# Patient Record
Sex: FEMALE | Race: WHITE | URBAN - NONMETROPOLITAN AREA
[De-identification: names, ages, dates, MRNs, and addresses within clinical notes are randomized per-mention and may not be internally consistent; named-entity substitution may affect disease eponyms.]

---

## 2021-12-06 ENCOUNTER — TELEPHONE (OUTPATIENT)
Dept: FAMILY MEDICINE CLINIC | Age: 41
End: 2021-12-06

## 2021-12-06 NOTE — TELEPHONE ENCOUNTER
I called the patient back and she said that she has been having some chest pain since Saturday, but it's not that bad. She said that she [facility] and it said \"Inconclusive Right Bundle Branch Block. \" She said she had another one done today and it was Normal Sinus Rhythm. She wanted to know if you could order some labs for her to have done, an EKG, and a cardiology referral so that she can avoid going to the ER because she does not want to go if she does not have to. I advised her that she should go for the chest pain, but she said she would like to see if you could order these first instead. I told her you were out of the office so I'm not sure when I will get a response, so if she were to have any increased chest pain or sob before she hears back from me then she need to go to the ER. She verbalized understanding.

## 2021-12-06 NOTE — TELEPHONE ENCOUNTER
This pt called asking for you. I told her you were on the phone. She wouldn't give me any information after I asked what it was regarding. She just said she had some questions  that she wanted you to ask Naz. [phone].

## 2021-12-07 NOTE — TELEPHONE ENCOUNTER
I haven't seen this pt since we've been in epic. She will need an apt when I have an opening; she will need to go to the er as you've told her if she develops any concerning s/s in the interim.

## 2021-12-27 ENCOUNTER — OFFICE VISIT (OUTPATIENT)
Dept: FAMILY MEDICINE CLINIC | Age: 41
End: 2021-12-27
Payer: COMMERCIAL

## 2021-12-27 VITALS
HEIGHT: 63 IN | WEIGHT: 132.2 LBS | DIASTOLIC BLOOD PRESSURE: 70 MMHG | BODY MASS INDEX: 23.42 KG/M2 | HEART RATE: 97 BPM | OXYGEN SATURATION: 98 % | RESPIRATION RATE: 18 BRPM | SYSTOLIC BLOOD PRESSURE: 116 MMHG

## 2021-12-27 DIAGNOSIS — R07.9 CHEST PAIN, UNSPECIFIED TYPE: ICD-10-CM

## 2021-12-27 DIAGNOSIS — R00.2 PALPITATIONS: ICD-10-CM

## 2021-12-27 DIAGNOSIS — Z00.00 WELLNESS EXAMINATION: Primary | ICD-10-CM

## 2021-12-27 DIAGNOSIS — Z82.49 FAMILY HISTORY OF MI (MYOCARDIAL INFARCTION): ICD-10-CM

## 2021-12-27 PROCEDURE — 99395 PREV VISIT EST AGE 18-39: CPT | Performed by: NURSE PRACTITIONER

## 2021-12-27 PROCEDURE — 99214 OFFICE O/P EST MOD 30 MIN: CPT | Performed by: NURSE PRACTITIONER

## 2021-12-27 RX ORDER — DESVENLAFAXINE SUCCINATE 50 MG/1
TABLET, EXTENDED RELEASE ORAL
Qty: 30 TABLET | Refills: 2 | Status: SHIPPED | OUTPATIENT
Start: 2021-12-27

## 2021-12-27 RX ORDER — DESVENLAFAXINE 25 MG/1
TABLET, EXTENDED RELEASE ORAL
Qty: 7 TABLET | Refills: 0 | Status: SHIPPED | OUTPATIENT
Start: 2021-12-27

## 2021-12-27 NOTE — PROGRESS NOTES
Lynsey Carlin presents today for   Chief Complaint   Patient presents with    Follow-up       Is someone accompanying this pt? No    Is the patient using any DME equipment during OV? No    Depression Screening:  3 most recent PHQ Screens 12/27/2021   Little interest or pleasure in doing things Not at all   Feeling down, depressed, irritable, or hopeless Not at all   Total Score PHQ 2 0       Learning Assessment:  Learning Assessment 12/27/2021   PRIMARY LEARNER Patient   HIGHEST LEVEL OF EDUCATION - PRIMARY LEARNER  4 YEARS OF COLLEGE   BARRIERS PRIMARY LEARNER NONE   CO-LEARNER CAREGIVER No   PRIMARY LANGUAGE ENGLISH   LEARNER PREFERENCE PRIMARY LISTENING   ANSWERED BY patient   RELATIONSHIP SELF       Health Maintenance reviewed and discussed and ordered per Provider. Health Maintenance Due   Topic Date Due    Hepatitis C Screening  Never done    DTaP/Tdap/Td series (1 - Tdap) Never done    Cervical cancer screen  Never done    COVID-19 Vaccine (3 - Booster for Pfizer series) 08/19/2021   . Coordination of Care:  1. Have you been to the ER, urgent care clinic since your last visit? Hospitalized since your last visit? No    2. Have you seen or consulted any other health care providers outside of the [address] [place] [address] since your last visit? Include any pap smears or colon screening.  No

## 2021-12-27 NOTE — PROGRESS NOTES
Sanchez Dorsey is a 46 y. o.female presents with   Chief Complaint   Patient presents with    Follow-up       42-year-old female presents today in office for annual adult preventive exam.  Today she complains of intermittent periods of left-sided chest pain which she describes as \"gripping and turning \". She goes on to admit to being under increasing amounts of stress related to building a new home. She also expresses concern as her biological mother  at the age of 47  as a result of sudden cardiac death. Subjective:           Past Medical History:   Diagnosis Date    Acid reflux     Anxiety     Depression      Past Surgical History:   Procedure Laterality Date    HX APPENDECTOMY      HX  SECTION      HX CYSTECTOMY      HX PARTIAL HYSTERECTOMY      half ovary left     Social History     Tobacco Use    Smoking status: Former Smoker     Years: 10.00    Smokeless tobacco: Never Used   Vaping Use    Vaping Use: Never used   Substance and Sexual Activity    Alcohol use: Yes     Comment: social    Drug use: Never    Sexual activity: Yes     Partners: Male     Birth control/protection: Surgical     Current Outpatient Medications   Medication Sig Dispense Refill    desvenlafaxine succinate (Pristiq) 25 mg ER tablet 1 tab po once daily x 7 days 7 Tablet 0    desvenlafaxine succinate (Pristiq) 50 mg ER tablet 1 tab po once daily; begin after completion of 7-day course of pristiq 25 mg 30 Tablet 2     Allergies   Allergen Reactions    Zoloft [Sertraline] Other (comments)     Convulsions, passed out, loss of bowel control     The patient has a family history of    REVIEW OF SYSTEMS  Review of Systems   Constitutional: Negative for chills and fever. HENT: Negative for ear discharge, ear pain, hearing loss, sinus pain and sore throat. Eyes: Negative for pain. Respiratory: Negative for cough and shortness of breath. Cardiovascular: Positive for chest pain and palpitations.  Negative for leg swelling. Gastrointestinal: Negative for abdominal pain, nausea and vomiting. Genitourinary: Negative for dysuria, frequency and urgency. Musculoskeletal: Negative for falls, myalgias and neck pain. Skin: Negative for rash. Neurological: Negative for dizziness, tingling, tremors and headaches. Psychiatric/Behavioral: Negative for depression, substance abuse and suicidal ideas. The patient is nervous/anxious. The patient does not have insomnia. Objective:     Visit Vitals  /70 (BP 1 Location: Left upper arm, BP Patient Position: Sitting, BP Cuff Size: Adult)   Pulse 97   Resp 18   Ht 5' 3\" (1.6 m)   Wt 132 lb 3.2 oz (60 kg)   SpO2 98%   BMI 23.42 kg/m²       Current Outpatient Medications   Medication Instructions    desvenlafaxine succinate (Pristiq) 25 mg ER tablet 1 tab po once daily x 7 days    desvenlafaxine succinate (Pristiq) 50 mg ER tablet 1 tab po once daily; begin after completion of 7-day course of pristiq 25 mg        PHYSICAL EXAM  Physical Exam  Cardiovascular:      Heart sounds: Normal heart sounds. Pulmonary:      Breath sounds: Normal breath sounds. Musculoskeletal:         General: No swelling. Lymphadenopathy:      Cervical: No cervical adenopathy. Skin:     General: Skin is warm. Neurological:      Mental Status: She is alert and oriented to person, place, and time. Psychiatric:         Mood and Affect: Mood normal.         Behavior: Behavior normal.         Thought Content: Thought content normal.         Judgment: Judgment normal.         Assessment/Plan:     Diagnoses and all orders for this visit:    1. Wellness examination  -     CBC W/O DIFF  -     LIPID PANEL  -     METABOLIC PANEL, COMPREHENSIVE  -     VITAMIN D, 25 HYDROXY  -     VITAMIN B12  -     HEPATITIS C AB    2.  Chest pain, unspecified type  -     TSH 3RD GENERATION  -     THYROID STIMULATING IMMUNOGLOBULIN  -     THYROID PEROXIDASE (TPO) AB  -     MAGNESIUM  -     T4, FREE  -     ECHO ADULT COMPLETE; Future  -     NUCLEAR CARDIAC STRESS TEST; Future  Given patient's familial history along with her complaints of today I am ordering stress test echocardiogram as well as multiple labs I will contact her once I receive the results. Patient will call 911 or go to the closest emergency department if she develops chest pain palpitations or other concerning signs or symptoms. 3. Family history of MI (myocardial infarction)  -     ECHO ADULT COMPLETE; Future  -     NUCLEAR CARDIAC STRESS TEST; Future    4. Palpitations  -     TSH 3RD GENERATION  -     THYROID STIMULATING IMMUNOGLOBULIN  -     THYROID PEROXIDASE (TPO) AB  -     MAGNESIUM  -     T4, FREE    Other orders  -     desvenlafaxine succinate (Pristiq) 25 mg ER tablet; 1 tab po once daily x 7 days  -     desvenlafaxine succinate (Pristiq) 50 mg ER tablet; 1 tab po once daily; begin after completion of 7-day course of pristiq 25 mg        Follow-up and Dispositions    · Return Contingent upon test results. Disclaimer:    I have discussed the diagnosis with the patient and the intended plan as seen above. The patient understands our medical plan. The risks, benefits and significant side effects of all medications have been reviewed. Anticipated time course and progression of condition reviewed. All questions have been addressed. She received an after visit summary, with information reviewed, and questions answered. Where appropriate, she is instructed to call the clinic if she has not been notified either by phone or through [address] with the results of her tests or with an appointment plan for any referrals within 1 week(s). The patient  is to call if her condition worsens or fails to improve or if significant side effects are experienced.        Michelle Ngo NP

## 2022-02-15 ENCOUNTER — TELEPHONE (OUTPATIENT)
Dept: FAMILY MEDICINE CLINIC | Age: 42
End: 2022-02-15

## 2022-02-15 NOTE — TELEPHONE ENCOUNTER
----- Message from Ciera Hernandez sent at 2/15/2022  8:29 AM EST -----  Subject: Message to Provider    QUESTIONS  Information for Provider? pt would like a call back about her test results   and a plan of action   ---------------------------------------------------------------------------  --------------  [address]  What is the best way for the office to contact you? OK to leave message on   voicemail  Preferred Call Back Phone Number? [phone]  ---------------------------------------------------------------------------  --------------  SCRIPT ANSWERS  Relationship to Patient?  Self

## 2022-03-02 ENCOUNTER — TELEPHONE (OUTPATIENT)
Dept: FAMILY MEDICINE CLINIC | Age: 42
End: 2022-03-02

## 2022-03-24 ENCOUNTER — TELEPHONE (OUTPATIENT)
Dept: FAMILY MEDICINE CLINIC | Age: 42
End: 2022-03-24

## 2022-03-24 DIAGNOSIS — R07.9 CHEST PAIN, UNSPECIFIED TYPE: Primary | ICD-10-CM

## 2022-03-24 DIAGNOSIS — R00.2 PALPITATIONS: ICD-10-CM

## 2022-03-24 DIAGNOSIS — Z82.49 FAMILY HISTORY OF MI (MYOCARDIAL INFARCTION): ICD-10-CM

## 2022-03-24 NOTE — TELEPHONE ENCOUNTER
----- Message from Layman Senters sent at 3/23/2022  2:23 PM EDT -----  Subject: Referral Request    QUESTIONS   Reason for referral request? Pt would like a referral for a cardiologist.   Has the physician seen you for this condition before? No   Preferred Specialist (if applicable)? Do you already have an appointment scheduled? Additional Information for Provider?   ---------------------------------------------------------------------------  --------------  CALL BACK INFO  What is the best way for the office to contact you? OK to leave message on   voicemail  Preferred Call Back Phone Number?  [phone]

## 2023-02-13 DIAGNOSIS — Z82.49 FAMILY HISTORY OF MI (MYOCARDIAL INFARCTION): ICD-10-CM

## 2023-02-13 DIAGNOSIS — R07.9 CHEST PAIN, UNSPECIFIED TYPE: Primary | ICD-10-CM

## 2023-02-13 DIAGNOSIS — R00.2 PALPITATIONS: ICD-10-CM

## 2023-10-12 DIAGNOSIS — R30.0 BURNING WITH URINATION: Primary | ICD-10-CM

## 2023-10-13 ENCOUNTER — TELEPHONE (OUTPATIENT)
Dept: FAMILY MEDICINE CLINIC | Facility: CLINIC | Age: 43
End: 2023-10-13

## 2023-10-13 ENCOUNTER — TELEMEDICINE (OUTPATIENT)
Dept: FAMILY MEDICINE CLINIC | Facility: CLINIC | Age: 43
End: 2023-10-13

## 2023-10-13 DIAGNOSIS — N30.01 ACUTE CYSTITIS WITH HEMATURIA: Primary | ICD-10-CM

## 2023-10-13 RX ORDER — CIPROFLOXACIN 500 MG/1
500 TABLET, FILM COATED ORAL 2 TIMES DAILY
Qty: 14 TABLET | Refills: 0 | Status: SHIPPED | OUTPATIENT
Start: 2023-10-13 | End: 2023-10-20

## 2023-10-13 NOTE — PROGRESS NOTES
Patient called into the office complaining of \"extreme burning, bladder spasms, frequent urination feeling like razor blades with urination\". Patient relates that through [facility] she was prescribed Macrobid and developed a reaction of some sort therefore they prescribed clindamycin roughly 2 weeks ago. She states that the frequent urination burning and bladder spasms returned October 11, 2023. She relates that she has tried Uricalm with no relief in signs or symptoms. Urinalysis shows small occult blood,  positive nitrates, large leukocytes, urine RBCs 21-50. Starting patient on Cipro 500 mg twice daily x7 days.   12 minutes spent on encounter

## 2023-10-14 LAB
BACTERIA: NEGATIVE
BILIRUB SERPL-MCNC: ABNORMAL MG/DL
BLOOD: ABNORMAL
CLARITY: ABNORMAL
COLOR: ABNORMAL
EPITHELIAL CELLS: ABNORMAL /HPF
GLUCOSE: NEGATIVE MG/DL
HYALINE CASTS: ABNORMAL /LPF (ref 0–2)
ICTOTEST - UA HGH: NEGATIVE
KETONES, URINE: NEGATIVE MG/DL
LEUKOCYTE ESTERASE, URINE: ABNORMAL
NITRITE, URINE: POSITIVE
PH, URINE: 6 PH (ref 5–8)
PROTEIN UA: ABNORMAL MG/DL
RBC URINE: ABNORMAL /HPF
RESULT: NORMAL
SPECIFIC GRAVITY: 1.01 (ref 1–1.03)
UROBILINOGEN: 1 MG/DL
WBC UA: ABNORMAL /HPF (ref 0–5)

## 2024-10-03 ENCOUNTER — OFFICE VISIT (OUTPATIENT)
Facility: CLINIC | Age: 44
End: 2024-10-03

## 2024-10-03 VITALS
RESPIRATION RATE: 18 BRPM | WEIGHT: 134.2 LBS | HEIGHT: 63 IN | HEART RATE: 70 BPM | TEMPERATURE: 98 F | SYSTOLIC BLOOD PRESSURE: 114 MMHG | DIASTOLIC BLOOD PRESSURE: 77 MMHG | BODY MASS INDEX: 23.78 KG/M2 | OXYGEN SATURATION: 98 %

## 2024-10-03 DIAGNOSIS — J01.90 ACUTE BACTERIAL SINUSITIS: ICD-10-CM

## 2024-10-03 DIAGNOSIS — M54.2 NECK PAIN: Primary | ICD-10-CM

## 2024-10-03 DIAGNOSIS — B96.89 ACUTE BACTERIAL SINUSITIS: ICD-10-CM

## 2024-10-03 LAB
MONONUCLEOSIS SCREEN POC: NORMAL
VALID INTERNAL CONTROL: YES

## 2024-10-03 RX ORDER — FLUCONAZOLE 150 MG/1
150 TABLET ORAL ONCE
Qty: 1 TABLET | Refills: 0 | Status: SHIPPED | OUTPATIENT
Start: 2024-10-03 | End: 2024-10-03

## 2024-10-03 RX ORDER — PREDNISONE 10 MG/1
TABLET ORAL
Qty: 1 EACH | Refills: 0 | Status: SHIPPED | OUTPATIENT
Start: 2024-10-03

## 2024-10-03 RX ORDER — DOXYCYCLINE HYCLATE 100 MG
100 TABLET ORAL 2 TIMES DAILY
Qty: 20 TABLET | Refills: 0 | Status: SHIPPED | OUTPATIENT
Start: 2024-10-03 | End: 2024-10-13

## 2024-10-03 SDOH — ECONOMIC STABILITY: FOOD INSECURITY: WITHIN THE PAST 12 MONTHS, THE FOOD YOU BOUGHT JUST DIDN'T LAST AND YOU DIDN'T HAVE MONEY TO GET MORE.: NEVER TRUE

## 2024-10-03 SDOH — ECONOMIC STABILITY: INCOME INSECURITY: HOW HARD IS IT FOR YOU TO PAY FOR THE VERY BASICS LIKE FOOD, HOUSING, MEDICAL CARE, AND HEATING?: NOT HARD AT ALL

## 2024-10-03 SDOH — ECONOMIC STABILITY: FOOD INSECURITY: WITHIN THE PAST 12 MONTHS, YOU WORRIED THAT YOUR FOOD WOULD RUN OUT BEFORE YOU GOT MONEY TO BUY MORE.: NEVER TRUE

## 2024-10-03 ASSESSMENT — PATIENT HEALTH QUESTIONNAIRE - PHQ9
SUM OF ALL RESPONSES TO PHQ QUESTIONS 1-9: 0
1. LITTLE INTEREST OR PLEASURE IN DOING THINGS: NOT AT ALL
2. FEELING DOWN, DEPRESSED OR HOPELESS: NOT AT ALL
SUM OF ALL RESPONSES TO PHQ9 QUESTIONS 1 & 2: 0
SUM OF ALL RESPONSES TO PHQ QUESTIONS 1-9: 0

## 2024-10-03 ASSESSMENT — ENCOUNTER SYMPTOMS
SINUS PRESSURE: 1
SINUS PAIN: 1

## 2024-10-03 NOTE — PROGRESS NOTES
Yohana Maher presents today for   Chief Complaint   Patient presents with    Neck Pain       Is someone accompanying this pt? no    Is the patient using any DME equipment during OV? no    Depression Screening:      10/3/2024     1:12 PM   PHQ-9 Questionaire   Little interest or pleasure in doing things 0   Feeling down, depressed, or hopeless 0   PHQ-9 Total Score 0       Fall Risk       No data to display                 Health Maintenance reviewed and discussed and ordered per Provider.    Health Maintenance Due   Topic Date Due    Depression Screen  Never done    Varicella vaccine (1 of 2 - 13+ 2-dose series) Never done    HIV screen  Never done    Hepatitis C screen  Never done    Hepatitis B vaccine (1 of 3 - 19+ 3-dose series) Never done    DTaP/Tdap/Td vaccine (1 - Tdap) Never done    Lipids  Never done    Breast cancer screen  12/02/2022    Flu vaccine (1) 08/01/2024    COVID-19 Vaccine (3 - 2023-24 season) 09/01/2024   .        \"Have you been to the ER, urgent care clinic since your last visit?  Hospitalized since your last visit?\"    NO    “Have you seen or consulted any other health care providers outside our system since your last visit?”    NO        Click Here for Release of Records Request   
CNP

## 2024-10-10 ENCOUNTER — PATIENT MESSAGE (OUTPATIENT)
Facility: CLINIC | Age: 44
End: 2024-10-10

## 2024-10-10 DIAGNOSIS — Z11.4 ENCOUNTER FOR SCREENING FOR HIV: ICD-10-CM

## 2024-10-10 DIAGNOSIS — Z00.00 ANNUAL WELLNESS VISIT: Primary | ICD-10-CM

## 2024-10-10 DIAGNOSIS — M54.2 NECK PAIN: ICD-10-CM

## 2024-10-10 DIAGNOSIS — Z11.59 NEED FOR HEPATITIS B SCREENING TEST: ICD-10-CM

## 2024-10-10 DIAGNOSIS — Z11.59 NEED FOR HEPATITIS C SCREENING TEST: ICD-10-CM

## 2024-10-11 DIAGNOSIS — Z11.4 ENCOUNTER FOR SCREENING FOR HIV: ICD-10-CM

## 2024-10-11 DIAGNOSIS — Z11.59 NEED FOR HEPATITIS B SCREENING TEST: ICD-10-CM

## 2024-10-11 DIAGNOSIS — Z11.59 NEED FOR HEPATITIS C SCREENING TEST: ICD-10-CM

## 2024-10-11 DIAGNOSIS — Z00.00 ANNUAL WELLNESS VISIT: ICD-10-CM
